# Patient Record
Sex: FEMALE | Race: WHITE | NOT HISPANIC OR LATINO | URBAN - METROPOLITAN AREA
[De-identification: names, ages, dates, MRNs, and addresses within clinical notes are randomized per-mention and may not be internally consistent; named-entity substitution may affect disease eponyms.]

---
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
## 2025-01-03 ENCOUNTER — TELEPHONE (OUTPATIENT)
Dept: PEDIATRICS | Facility: CLINIC | Age: 20
End: 2025-01-03
Payer: COMMERCIAL

## 2025-01-03 NOTE — TELEPHONE ENCOUNTER
Medication Update:  Pt thinks sertraline is working well. Has had no side effects from the medication. Pt was unsure if you wanted a phone update or OV. Did not see it specified in notes. Let me know if you would like me to schedule appt.

## 2025-01-09 ENCOUNTER — LAB (OUTPATIENT)
Dept: LAB | Facility: LAB | Age: 20
End: 2025-01-09
Payer: COMMERCIAL

## 2025-01-09 DIAGNOSIS — R19.4 CHANGE IN BOWEL HABITS: ICD-10-CM

## 2025-01-09 DIAGNOSIS — R63.4 ABNORMAL WEIGHT LOSS: ICD-10-CM

## 2025-01-09 DIAGNOSIS — R14.0 ABDOMINAL DISTENSION (GASEOUS): ICD-10-CM

## 2025-01-09 DIAGNOSIS — R11.0 NAUSEA: ICD-10-CM

## 2025-01-09 DIAGNOSIS — K52.9 NONINFECTIVE GASTROENTERITIS AND COLITIS, UNSPECIFIED: ICD-10-CM

## 2025-01-09 DIAGNOSIS — R10.9 UNSPECIFIED ABDOMINAL PAIN: ICD-10-CM

## 2025-01-09 DIAGNOSIS — R19.4 CHANGE IN BOWEL HABIT: Primary | ICD-10-CM

## 2025-01-09 DIAGNOSIS — K21.9 GASTRO-ESOPHAGEAL REFLUX DISEASE WITHOUT ESOPHAGITIS: ICD-10-CM

## 2025-01-09 LAB
25(OH)D3 SERPL-MCNC: 23 NG/ML (ref 30–100)
ALBUMIN SERPL BCP-MCNC: 4.3 G/DL (ref 3.4–5)
ALP SERPL-CCNC: 65 U/L (ref 33–110)
ALT SERPL W P-5'-P-CCNC: 8 U/L (ref 7–45)
ANION GAP SERPL CALC-SCNC: 8 MMOL/L (ref 10–20)
AST SERPL W P-5'-P-CCNC: 14 U/L (ref 9–39)
BASOPHILS # BLD AUTO: 0.06 X10*3/UL (ref 0–0.1)
BASOPHILS NFR BLD AUTO: 0.7 %
BILIRUB SERPL-MCNC: 0.4 MG/DL (ref 0–1.2)
BUN SERPL-MCNC: 13 MG/DL (ref 6–23)
CALCIUM SERPL-MCNC: 9.6 MG/DL (ref 8.6–10.3)
CHLORIDE SERPL-SCNC: 105 MMOL/L (ref 98–107)
CO2 SERPL-SCNC: 29 MMOL/L (ref 21–32)
CREAT SERPL-MCNC: 0.66 MG/DL (ref 0.5–1.05)
CRP SERPL-MCNC: 0.36 MG/DL
EGFRCR SERPLBLD CKD-EPI 2021: >90 ML/MIN/1.73M*2
EOSINOPHIL # BLD AUTO: 0.25 X10*3/UL (ref 0–0.7)
EOSINOPHIL NFR BLD AUTO: 2.8 %
ERYTHROCYTE [DISTWIDTH] IN BLOOD BY AUTOMATED COUNT: 12 % (ref 11.5–14.5)
GLUCOSE SERPL-MCNC: 93 MG/DL (ref 74–99)
HCT VFR BLD AUTO: 44.4 % (ref 36–46)
HGB BLD-MCNC: 14.4 G/DL (ref 12–16)
IMM GRANULOCYTES # BLD AUTO: 0.02 X10*3/UL (ref 0–0.7)
IMM GRANULOCYTES NFR BLD AUTO: 0.2 % (ref 0–0.9)
LYMPHOCYTES # BLD AUTO: 1.95 X10*3/UL (ref 1.2–4.8)
LYMPHOCYTES NFR BLD AUTO: 21.9 %
MCH RBC QN AUTO: 27.6 PG (ref 26–34)
MCHC RBC AUTO-ENTMCNC: 32.4 G/DL (ref 32–36)
MCV RBC AUTO: 85 FL (ref 80–100)
MONOCYTES # BLD AUTO: 0.41 X10*3/UL (ref 0.1–1)
MONOCYTES NFR BLD AUTO: 4.6 %
NEUTROPHILS # BLD AUTO: 6.2 X10*3/UL (ref 1.2–7.7)
NEUTROPHILS NFR BLD AUTO: 69.8 %
NRBC BLD-RTO: 0 /100 WBCS (ref 0–0)
PLATELET # BLD AUTO: 349 X10*3/UL (ref 150–450)
POTASSIUM SERPL-SCNC: 4.2 MMOL/L (ref 3.5–5.3)
PROT SERPL-MCNC: 6.9 G/DL (ref 6.4–8.2)
RBC # BLD AUTO: 5.21 X10*6/UL (ref 4–5.2)
SODIUM SERPL-SCNC: 138 MMOL/L (ref 136–145)
TSH SERPL-ACNC: 1.3 MIU/L (ref 0.44–3.98)
WBC # BLD AUTO: 8.9 X10*3/UL (ref 4.4–11.3)

## 2025-01-09 PROCEDURE — 84443 ASSAY THYROID STIM HORMONE: CPT

## 2025-01-09 PROCEDURE — 86003 ALLG SPEC IGE CRUDE XTRC EA: CPT

## 2025-01-09 PROCEDURE — 85025 COMPLETE CBC W/AUTO DIFF WBC: CPT

## 2025-01-09 PROCEDURE — 80053 COMPREHEN METABOLIC PANEL: CPT

## 2025-01-09 PROCEDURE — 82306 VITAMIN D 25 HYDROXY: CPT

## 2025-01-09 PROCEDURE — 86140 C-REACTIVE PROTEIN: CPT

## 2025-01-09 NOTE — TELEPHONE ENCOUNTER
Patient returned phone call. Scheduled for med check in March. Is headed back to college on Sunday, is back in March. [place], so I was un able to do a virtual med check. Provided call hours for tomorrow morning.

## 2025-01-10 ENCOUNTER — OFFICE VISIT (OUTPATIENT)
Dept: PEDIATRICS | Facility: CLINIC | Age: 20
End: 2025-01-10
Payer: COMMERCIAL

## 2025-01-10 VITALS
BODY MASS INDEX: 17.64 KG/M2 | HEIGHT: 71 IN | SYSTOLIC BLOOD PRESSURE: 113 MMHG | DIASTOLIC BLOOD PRESSURE: 66 MMHG | WEIGHT: 126 LBS | HEART RATE: 96 BPM

## 2025-01-10 DIAGNOSIS — F41.1 GENERALIZED ANXIETY DISORDER: ICD-10-CM

## 2025-01-10 DIAGNOSIS — Z23 NEED FOR INFLUENZA VACCINATION: Primary | ICD-10-CM

## 2025-01-10 LAB
CLAM IGE QN: <0.1 KU/L
CODFISH IGE QN: <0.1 KU/L
CORN IGE QN: <0.1
EGG WHITE IGE QN: <0.1 KU/L
MILK IGE QN: <0.1 KU/L
PEANUT IGE QN: <0.1 KU/L
SCALLOP IGE QN: <0.1 KU/L
SESAME SEED IGE QN: <0.1 KU/L
SHRIMP IGE QN: <0.1 KU/L
SOYBEAN IGE QN: <0.1 KU/L
WALNUT IGE QN: <0.1 KU/L
WHEAT IGE QN: <0.1 KU/L

## 2025-01-10 PROCEDURE — 90471 IMMUNIZATION ADMIN: CPT | Performed by: PEDIATRICS

## 2025-01-10 PROCEDURE — 99213 OFFICE O/P EST LOW 20 MIN: CPT | Performed by: PEDIATRICS

## 2025-01-10 PROCEDURE — 3008F BODY MASS INDEX DOCD: CPT | Performed by: PEDIATRICS

## 2025-01-10 PROCEDURE — 90656 IIV3 VACC NO PRSV 0.5 ML IM: CPT | Performed by: PEDIATRICS

## 2025-01-10 RX ORDER — SERTRALINE HYDROCHLORIDE 50 MG/1
50 TABLET, FILM COATED ORAL DAILY
Qty: 90 TABLET | Refills: 0 | Status: SHIPPED | OUTPATIENT
Start: 2025-01-10 | End: 2025-04-10

## 2025-01-10 NOTE — PROGRESS NOTES
"Mental Health Follow-up  Ramandeep Kay is a 19 y.o. female  following up today for mental health status.  Started zoloft on 12/18 for anxiety.  Counseling: every other week.   Current medication: zoloft 25  Patient reports no side effects.  Patient reports symptoms have remain unchanged since last visit.    Depression Symptoms:  mood is pretty good as she has been on break    Anxiety Symptoms: Excessive anxiety/ worry- yes, Restless/ Edgy- yes, Sleep changes- yes, and symptoms have started to increase as school return has gotten closer.     Self Harm Symptoms:   Self Mutilation: denies  Suicidal Ideation: denies    Alcohol and drug use discussed and denied.     Review of Systems: Negative except those noted in current and interim history    PHYSICAL EXAM  /66 (BP Location: Left arm, Patient Position: Sitting)   Pulse 96   Ht 1.803 m (5' 11\")   Wt 57.2 kg (126 lb)   BMI 17.57 kg/m²     General:  alert and oriented, in no acute distress   HEENT:  throat normal without erythema or exudate   Neck: no adenopathy and thyroid not enlarged, symmetric, no tenderness/mass/nodules.   Lungs: clear to auscultation bilaterally   Heart: regular rate and rhythm, S1, S2 normal, no murmur, click, rub or gallop   Skin:  warm and dry, no hyperpigmentation, vitiligo, or suspicious lesions      Extremities:  extremities normal, warm and well-perfused; no cyanosis, clubbing, or edema      Neurological: alert, oriented x 3, no deficits noted in general exam.     ASSESSMENT AND PLAN  Diagnoses and all orders for this visit:  Need for influenza vaccination  -     Flu vaccine, trivalent, preservative free, age 6 months and greater (Fluraix/Fluzone/Flulaval)  Generalized anxiety disorder  -     sertraline (Zoloft) 50 mg tablet; Take 1 tablet (50 mg) by mouth once daily.      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    The plan is to increase dose of zoloft* to at 50 mg/day.  She " [facility] to let me know how she is doing.  We could increase to 75 mg if needed.     Recommendations were discussed and patient and/or parent agree(s) to the above plan. Patient and/or parent demonstrate understanding and acceptance of risks and benefits and plan.  Contracted verbally for safety.  Patient instructed to call if concerns and to follow up in clinic in 2-3 month(s). May return to clinic or call sooner if significant side effects or concerns.

## 2025-02-18 DIAGNOSIS — N92.0 MENORRHAGIA WITH REGULAR CYCLE: ICD-10-CM

## 2025-02-25 NOTE — TELEPHONE ENCOUNTER
Message left on prescription line. Pt would like birth control script sent to [facility]. I was unsure if you are able to send. Did add pharmacy to file.

## 2025-02-27 RX ORDER — SERTRALINE HYDROCHLORIDE 25 MG/1
TABLET, FILM COATED ORAL
Qty: 42 TABLET | Refills: 0 | OUTPATIENT
Start: 2025-02-27

## 2025-02-28 RX ORDER — NORETHINDRONE AND ETHINYL ESTRADIOL 0.5-0.035
KIT ORAL
Qty: 28 TABLET | Refills: 3 | Status: SHIPPED | OUTPATIENT
Start: 2025-02-28

## 2025-03-05 RX ORDER — ERGOCALCIFEROL 1.25 MG/1
1 CAPSULE ORAL
COMMUNITY
Start: 2025-01-16

## 2025-03-14 ENCOUNTER — APPOINTMENT (OUTPATIENT)
Dept: PEDIATRICS | Facility: CLINIC | Age: 20
End: 2025-03-14
Payer: COMMERCIAL

## 2025-03-14 VITALS
HEART RATE: 96 BPM | DIASTOLIC BLOOD PRESSURE: 67 MMHG | BODY MASS INDEX: 17.5 KG/M2 | HEIGHT: 71 IN | WEIGHT: 125 LBS | SYSTOLIC BLOOD PRESSURE: 107 MMHG

## 2025-03-14 DIAGNOSIS — F41.1 GENERALIZED ANXIETY DISORDER: ICD-10-CM

## 2025-03-14 PROCEDURE — 96127 BRIEF EMOTIONAL/BEHAV ASSMT: CPT | Performed by: PEDIATRICS

## 2025-03-14 PROCEDURE — 99213 OFFICE O/P EST LOW 20 MIN: CPT | Performed by: PEDIATRICS

## 2025-03-14 PROCEDURE — 3008F BODY MASS INDEX DOCD: CPT | Performed by: PEDIATRICS

## 2025-03-14 RX ORDER — SERTRALINE HYDROCHLORIDE 50 MG/1
50 TABLET, FILM COATED ORAL DAILY
Qty: 90 TABLET | Refills: 0 | Status: SHIPPED | OUTPATIENT
Start: 2025-03-14 | End: 2025-06-12

## 2025-03-14 ASSESSMENT — ANXIETY QUESTIONNAIRES
1. FEELING NERVOUS, ANXIOUS, OR ON EDGE: SEVERAL DAYS
3. WORRYING TOO MUCH ABOUT DIFFERENT THINGS: SEVERAL DAYS
6. BECOMING EASILY ANNOYED OR IRRITABLE: NOT AT ALL
4. TROUBLE RELAXING: SEVERAL DAYS
2. NOT BEING ABLE TO STOP OR CONTROL WORRYING: NOT AT ALL
GAD7 TOTAL SCORE: 3
5. BEING SO RESTLESS THAT IT IS HARD TO SIT STILL: NOT AT ALL
2. NOT BEING ABLE TO STOP OR CONTROL WORRYING: NOT AT ALL
1. FEELING NERVOUS, ANXIOUS, OR ON EDGE: SEVERAL DAYS
5. BEING SO RESTLESS THAT IT IS HARD TO SIT STILL: NOT AT ALL
IF YOU CHECKED OFF ANY PROBLEMS ON THIS QUESTIONNAIRE, HOW DIFFICULT HAVE THESE PROBLEMS MADE IT FOR YOU TO DO YOUR WORK, TAKE CARE OF THINGS AT HOME, OR GET ALONG WITH OTHER PEOPLE: SOMEWHAT DIFFICULT
6. BECOMING EASILY ANNOYED OR IRRITABLE: NOT AT ALL
4. TROUBLE RELAXING: SEVERAL DAYS
IF YOU CHECKED OFF ANY PROBLEMS ON THIS QUESTIONNAIRE, HOW DIFFICULT HAVE THESE PROBLEMS MADE IT FOR YOU TO DO YOUR WORK, TAKE CARE OF THINGS AT HOME, OR GET ALONG WITH OTHER PEOPLE: SOMEWHAT DIFFICULT
7. FEELING AFRAID AS IF SOMETHING AWFUL MIGHT HAPPEN: NOT AT ALL
7. FEELING AFRAID AS IF SOMETHING AWFUL MIGHT HAPPEN: NOT AT ALL
3. WORRYING TOO MUCH ABOUT DIFFERENT THINGS: SEVERAL DAYS

## 2025-03-14 ASSESSMENT — PATIENT HEALTH QUESTIONNAIRE - PHQ9
SUM OF ALL RESPONSES TO PHQ9 QUESTIONS 1 & 2: 0
8. MOVING OR SPEAKING SO SLOWLY THAT OTHER PEOPLE COULD HAVE NOTICED. OR THE OPPOSITE, BEING SO FIGETY OR RESTLESS THAT YOU HAVE BEEN MOVING AROUND A LOT MORE THAN USUAL: NOT AT ALL
8. MOVING OR SPEAKING SO SLOWLY THAT OTHER PEOPLE COULD HAVE NOTICED. OR THE OPPOSITE - BEING SO FIDGETY OR RESTLESS THAT YOU HAVE BEEN MOVING AROUND A LOT MORE THAN USUAL: NOT AT ALL
5. POOR APPETITE OR OVEREATING: NOT AT ALL
1. LITTLE INTEREST OR PLEASURE IN DOING THINGS: NOT AT ALL
SUM OF ALL RESPONSES TO PHQ QUESTIONS 1-9: 2
10. IF YOU CHECKED OFF ANY PROBLEMS, HOW DIFFICULT HAVE THESE PROBLEMS MADE IT FOR YOU TO DO YOUR WORK, TAKE CARE OF THINGS AT HOME, OR GET ALONG WITH OTHER PEOPLE: NOT DIFFICULT AT ALL
2. FEELING DOWN, DEPRESSED OR HOPELESS: NOT AT ALL
7. TROUBLE CONCENTRATING ON THINGS, SUCH AS READING THE NEWSPAPER OR WATCHING TELEVISION: NOT AT ALL
4. FEELING TIRED OR HAVING LITTLE ENERGY: SEVERAL DAYS
6. FEELING BAD ABOUT YOURSELF - OR THAT YOU ARE A FAILURE OR HAVE LET YOURSELF OR YOUR FAMILY DOWN: NOT AT ALL
5. POOR APPETITE OR OVEREATING: NOT AT ALL
7. TROUBLE CONCENTRATING ON THINGS, SUCH AS READING THE NEWSPAPER OR WATCHING TELEVISION: NOT AT ALL
9. THOUGHTS THAT YOU WOULD BE BETTER OFF DEAD, OR OF HURTING YOURSELF: NOT AT ALL
9. THOUGHTS THAT YOU WOULD BE BETTER OFF DEAD, OR OF HURTING YOURSELF: NOT AT ALL
1. LITTLE INTEREST OR PLEASURE IN DOING THINGS: NOT AT ALL
2. FEELING DOWN, DEPRESSED OR HOPELESS: NOT AT ALL
3. TROUBLE FALLING OR STAYING ASLEEP: SEVERAL DAYS
10. IF YOU CHECKED OFF ANY PROBLEMS, HOW DIFFICULT HAVE THESE PROBLEMS MADE IT FOR YOU TO DO YOUR WORK, TAKE CARE OF THINGS AT HOME, OR GET ALONG WITH OTHER PEOPLE: NOT DIFFICULT AT ALL
4. FEELING TIRED OR HAVING LITTLE ENERGY: SEVERAL DAYS
6. FEELING BAD ABOUT YOURSELF - OR THAT YOU ARE A FAILURE OR HAVE LET YOURSELF OR YOUR FAMILY DOWN: NOT AT ALL
3. TROUBLE FALLING OR STAYING ASLEEP OR SLEEPING TOO MUCH: SEVERAL DAYS

## 2025-03-14 NOTE — PROGRESS NOTES
"Mental Health Follow-up  Ramandeep Kay is a 19 y.o. female  following up today for mental health status.    Counseling: every other week  Current medication: zoloft 50 mg qam  Patient reports  that she developed reflux symptoms and that it seemed to start with starting the zoloft.  She switched to taking it in the morning with a lot of water and that has helped.  Also seems more sensitive to alcohol effects .  Patient reports symptoms have improved since last visit.    Depression Symptoms:  not depressed    Anxiety Symptoms:  she is aware of the anxiety inciting factors but doesn't get anxious.     Alcohol and drug use discussed.     Review of Systems: Negative except those noted in current and interim history    PHYSICAL EXAM  /67   Pulse 96   Ht 1.803 m (5' 11\")   Wt 56.7 kg (125 lb)   BMI 17.43 kg/m²     General:  alert and oriented, in no acute distress   HEENT:  throat normal without erythema or exudate   Neck: no adenopathy and thyroid not enlarged, symmetric, no tenderness/mass/nodules.   Lungs: clear to auscultation bilaterally   Heart: regular rate and rhythm, S1, S2 normal, no murmur, click, rub or gallop   Skin:  warm and dry, no hyperpigmentation, vitiligo, or suspicious lesions      Extremities:  extremities normal, warm and well-perfused; no cyanosis, clubbing, or edema      Neurological: alert, oriented x 3, no deficits noted in general exam.     ASSESSMENT AND PLAN  Diagnoses and all orders for this visit:  Generalized anxiety disorder  -     sertraline (Zoloft) 50 mg tablet; Take 1 tablet (50 mg) by mouth once daily.      Ramandeep Kay does meet criteria for  MITRA .    The plan is to continue current dose of zoloft  at 50* mg/day.    Recommendations were discussed and patient and/or parent agree(s) to the above plan. Patient and/or parent demonstrate understanding and acceptance of risks and benefits and plan.  Contracted verbally for safety.  Patient instructed to call if concerns " and to follow up in clinic in 3 month(s) at [facility]. May return to clinic or call sooner if significant side effects or concerns.

## 2025-05-20 ENCOUNTER — TELEPHONE (OUTPATIENT)
Dept: PEDIATRICS | Facility: CLINIC | Age: 20
End: 2025-05-20
Payer: COMMERCIAL

## 2025-05-20 NOTE — TELEPHONE ENCOUNTER
Mom called , states there is no rush on this ... But her whole family is going to [place] in about a month and a half last time you gave them meds to go , she wanted to what your advice was before going this time. Thanks !

## 2025-06-09 ENCOUNTER — APPOINTMENT (OUTPATIENT)
Dept: INFECTIOUS DISEASES | Facility: CLINIC | Age: 20
End: 2025-06-09
Payer: COMMERCIAL

## 2025-06-09 ENCOUNTER — TELEPHONE (OUTPATIENT)
Dept: PEDIATRICS | Facility: CLINIC | Age: 20
End: 2025-06-09

## 2025-06-09 VITALS
BODY MASS INDEX: 18.9 KG/M2 | HEIGHT: 71 IN | WEIGHT: 135 LBS | SYSTOLIC BLOOD PRESSURE: 113 MMHG | TEMPERATURE: 98.1 F | DIASTOLIC BLOOD PRESSURE: 79 MMHG | HEART RATE: 97 BPM

## 2025-06-09 DIAGNOSIS — Z71.84 COUNSELING FOR TRAVEL: Primary | ICD-10-CM

## 2025-06-09 DIAGNOSIS — Z79.2 PREVENTIVE ANTIBIOTIC: ICD-10-CM

## 2025-06-09 DIAGNOSIS — Z71.84 TRAVEL ADVICE ENCOUNTER: Primary | ICD-10-CM

## 2025-06-09 DIAGNOSIS — Z29.89 ALTITUDE SICKNESS PROPHYLAXIS: ICD-10-CM

## 2025-06-09 PROCEDURE — 99211U01 TRAVEL NURSE VISIT (U01): Performed by: STUDENT IN AN ORGANIZED HEALTH CARE EDUCATION/TRAINING PROGRAM

## 2025-06-09 PROCEDURE — 90717 YELLOW FEVER VACCINE SUBQ: CPT | Performed by: STUDENT IN AN ORGANIZED HEALTH CARE EDUCATION/TRAINING PROGRAM

## 2025-06-09 PROCEDURE — 90471U01 YELLOW FEVER VACCINE SQ: Performed by: STUDENT IN AN ORGANIZED HEALTH CARE EDUCATION/TRAINING PROGRAM

## 2025-06-09 RX ORDER — ACETAZOLAMIDE 125 MG/1
125 TABLET ORAL 2 TIMES DAILY
Qty: 14 TABLET | Refills: 0 | Status: SHIPPED | OUTPATIENT
Start: 2025-06-09 | End: 2025-06-16

## 2025-06-09 RX ORDER — AZITHROMYCIN 500 MG/1
500 TABLET, FILM COATED ORAL DAILY
Qty: 3 TABLET | Refills: 0 | Status: SHIPPED | OUTPATIENT
Start: 2025-06-09 | End: 2025-06-12

## 2025-06-09 RX ORDER — ATOVAQUONE AND PROGUANIL HYDROCHLORIDE 250; 100 MG/1; MG/1
1 TABLET, FILM COATED ORAL DAILY
Qty: 18 TABLET | Refills: 0 | Status: SHIPPED | OUTPATIENT
Start: 2025-06-09 | End: 2025-06-27

## 2025-06-09 NOTE — PROGRESS NOTES
Traveling with family to [facility] and [facility] from 7/01-7/11/25. HepA 3/2007, 5/2009. Tdap 8/2017. Typhoid ViCPs 7/2023.      Ordered YF vaccine.  Recommended Polio vaccine but did not want.    Already has prescription for Malarone.    Discussed food, water, and insect precautions.

## 2025-06-17 ENCOUNTER — TELEPHONE (OUTPATIENT)
Dept: PEDIATRICS | Facility: CLINIC | Age: 20
End: 2025-06-17
Payer: COMMERCIAL

## 2025-06-17 DIAGNOSIS — Z79.2 PREVENTIVE ANTIBIOTIC: ICD-10-CM

## 2025-06-17 NOTE — TELEPHONE ENCOUNTER
Mom needs a new script for the atovaquone-proguaniL (Malarone) 250-100 mg tablet  sent to [facility] in [place]. Was originally sent to [place] pharmacy but Ramandeep is back home for summer.

## 2025-06-18 RX ORDER — ATOVAQUONE AND PROGUANIL HYDROCHLORIDE 250; 100 MG/1; MG/1
1 TABLET, FILM COATED ORAL DAILY
Qty: 18 TABLET | Refills: 0 | Status: SHIPPED | OUTPATIENT
Start: 2025-06-18 | End: 2025-07-06

## 2025-07-07 DIAGNOSIS — F41.1 GENERALIZED ANXIETY DISORDER: ICD-10-CM

## 2025-07-19 DIAGNOSIS — N92.0 MENORRHAGIA WITH REGULAR CYCLE: ICD-10-CM

## 2025-07-19 DIAGNOSIS — F41.1 GENERALIZED ANXIETY DISORDER: ICD-10-CM

## 2025-07-25 RX ORDER — NORETHINDRONE AND ETHINYL ESTRADIOL 0.5-0.035
1 KIT ORAL
Qty: 84 TABLET | Refills: 0 | Status: SHIPPED | OUTPATIENT
Start: 2025-07-25

## 2025-07-25 RX ORDER — SERTRALINE HYDROCHLORIDE 50 MG/1
50 TABLET, FILM COATED ORAL DAILY
Qty: 90 TABLET | Refills: 0 | OUTPATIENT
Start: 2025-07-25

## 2025-07-25 RX ORDER — SERTRALINE HYDROCHLORIDE 50 MG/1
50 TABLET, FILM COATED ORAL DAILY
Qty: 90 TABLET | Refills: 0 | Status: SHIPPED | OUTPATIENT
Start: 2025-07-25 | End: 2025-10-23

## 2025-07-25 NOTE — TELEPHONE ENCOUNTER
Rx Refill Request Telephone Encounter    Name:  Ramandeep Kay  :  [id number]  Medication Name:  Nortrel 0.5/35, 28, 0.5-35 mg-mcg tablet   Dose : As Directed  Route : As Directed  Frequency : As Directed  Quantity :    Directions :    Specific Pharmacy location:  [id number] - [facility] [address] AT [address]   [address], [place] [address]   Date of last appointment:  25  Date of next appointment:    Best number to reach patient:  [phone]       Rx Refill Request Telephone Encounter    Name:  Ramandeep Kay  :  [id number]  Medication Name:  Sertraline (Zoloft) 50 mg  Dose : 50 mg  Route : Oral  Frequency : Daily  Quantity :    Directions :  Take 1 tablet (50 mg) by mouth once daily.  Specific Pharmacy location:  [id number] - [facility] [address]   [address], [place] [address]   Date of last appointment:  25  Date of next appointment:    Best number to reach patient:  [phone]     Pt requested refill request via prescription line. She has NPV with adult primary (Madison Tillman) on 25. Do you want to see her for med check before that appointment?

## 2025-08-14 ENCOUNTER — APPOINTMENT (OUTPATIENT)
Dept: PRIMARY CARE | Facility: CLINIC | Age: 20
End: 2025-08-14
Payer: COMMERCIAL

## 2025-08-14 VITALS
SYSTOLIC BLOOD PRESSURE: 112 MMHG | BODY MASS INDEX: 18.48 KG/M2 | OXYGEN SATURATION: 98 % | DIASTOLIC BLOOD PRESSURE: 77 MMHG | RESPIRATION RATE: 21 BRPM | WEIGHT: 132 LBS | HEART RATE: 84 BPM | HEIGHT: 71 IN

## 2025-08-14 DIAGNOSIS — Z01.419 WOMEN'S ANNUAL ROUTINE GYNECOLOGICAL EXAMINATION: ICD-10-CM

## 2025-08-14 DIAGNOSIS — N92.0 MENORRHAGIA WITH REGULAR CYCLE: ICD-10-CM

## 2025-08-14 DIAGNOSIS — G43.009 MIGRAINE WITHOUT AURA AND WITHOUT STATUS MIGRAINOSUS, NOT INTRACTABLE: ICD-10-CM

## 2025-08-14 DIAGNOSIS — F41.1 GENERALIZED ANXIETY DISORDER: ICD-10-CM

## 2025-08-14 DIAGNOSIS — E55.9 VITAMIN D DEFICIENCY, UNSPECIFIED: Primary | ICD-10-CM

## 2025-08-14 PROBLEM — K51.00 ULCERATIVE PANCOLITIS (MULTI): Status: RESOLVED | Noted: 2023-12-01 | Resolved: 2025-08-14

## 2025-08-14 PROBLEM — J01.90 ACUTE SINUSITIS: Status: RESOLVED | Noted: 2024-05-21 | Resolved: 2025-08-14

## 2025-08-14 PROCEDURE — 3008F BODY MASS INDEX DOCD: CPT | Performed by: INTERNAL MEDICINE

## 2025-08-14 PROCEDURE — 1036F TOBACCO NON-USER: CPT | Performed by: INTERNAL MEDICINE

## 2025-08-14 PROCEDURE — 99204 OFFICE O/P NEW MOD 45 MIN: CPT | Performed by: INTERNAL MEDICINE

## 2025-08-14 RX ORDER — HYDROCODONE BITARTRATE AND ACETAMINOPHEN 5; 325 MG/1; MG/1
1 TABLET ORAL
COMMUNITY
Start: 2025-08-06 | End: 2025-08-14 | Stop reason: ALTCHOICE

## 2025-08-14 RX ORDER — SERTRALINE HYDROCHLORIDE 50 MG/1
50 TABLET, FILM COATED ORAL DAILY
Qty: 90 TABLET | Refills: 1 | Status: SHIPPED | OUTPATIENT
Start: 2025-08-14 | End: 2025-11-12

## 2025-08-14 RX ORDER — NORETHINDRONE AND ETHINYL ESTRADIOL 0.5-0.035
1 KIT ORAL
Qty: 84 TABLET | Refills: 1 | Status: SHIPPED | OUTPATIENT
Start: 2025-08-14

## 2025-08-14 ASSESSMENT — ANXIETY QUESTIONNAIRES
6. BECOMING EASILY ANNOYED OR IRRITABLE: NOT AT ALL
GAD7 TOTAL SCORE: 2
1. FEELING NERVOUS, ANXIOUS, OR ON EDGE: SEVERAL DAYS
5. BEING SO RESTLESS THAT IT IS HARD TO SIT STILL: SEVERAL DAYS
7. FEELING AFRAID AS IF SOMETHING AWFUL MIGHT HAPPEN: NOT AT ALL
IF YOU CHECKED OFF ANY PROBLEMS ON THIS QUESTIONNAIRE, HOW DIFFICULT HAVE THESE PROBLEMS MADE IT FOR YOU TO DO YOUR WORK, TAKE CARE OF THINGS AT HOME, OR GET ALONG WITH OTHER PEOPLE: SOMEWHAT DIFFICULT
3. WORRYING TOO MUCH ABOUT DIFFERENT THINGS: NOT AT ALL
4. TROUBLE RELAXING: NOT AT ALL
2. NOT BEING ABLE TO STOP OR CONTROL WORRYING: NOT AT ALL

## 2025-08-15 DIAGNOSIS — E55.9 VITAMIN D DEFICIENCY, UNSPECIFIED: Primary | ICD-10-CM

## 2025-08-15 LAB — 25(OH)D3+25(OH)D2 SERPL-MCNC: 55 NG/ML (ref 30–100)

## 2025-08-15 RX ORDER — ERGOCALCIFEROL 1.25 MG/1
CAPSULE ORAL
Qty: 6 CAPSULE | Refills: 1 | Status: SHIPPED | OUTPATIENT
Start: 2025-08-15

## 2025-09-03 ENCOUNTER — APPOINTMENT (OUTPATIENT)
Dept: PRIMARY CARE | Facility: CLINIC | Age: 20
End: 2025-09-03
Payer: COMMERCIAL

## 2025-11-25 ENCOUNTER — APPOINTMENT (OUTPATIENT)
Dept: PRIMARY CARE | Facility: CLINIC | Age: 20
End: 2025-11-25
Payer: COMMERCIAL